# Patient Record
Sex: MALE | Race: WHITE | NOT HISPANIC OR LATINO | Employment: OTHER | URBAN - NONMETROPOLITAN AREA
[De-identification: names, ages, dates, MRNs, and addresses within clinical notes are randomized per-mention and may not be internally consistent; named-entity substitution may affect disease eponyms.]

---

## 2017-06-13 ENCOUNTER — APPOINTMENT (OUTPATIENT)
Dept: DIABETES SERVICES | Facility: HOSPITAL | Age: 72
End: 2017-06-13

## 2017-06-15 ENCOUNTER — APPOINTMENT (OUTPATIENT)
Dept: DIABETES SERVICES | Facility: HOSPITAL | Age: 72
End: 2017-06-15

## 2021-01-16 ENCOUNTER — IMMUNIZATION (OUTPATIENT)
Dept: VACCINE CLINIC | Facility: HOSPITAL | Age: 76
End: 2021-01-16

## 2021-01-16 PROCEDURE — 91301 HC SARSCO02 VAC 100MCG/0.5ML IM: CPT | Performed by: OBSTETRICS & GYNECOLOGY

## 2021-01-16 PROCEDURE — 0011A: CPT | Performed by: OBSTETRICS & GYNECOLOGY

## 2021-03-03 ENCOUNTER — IMMUNIZATION (OUTPATIENT)
Dept: VACCINE CLINIC | Facility: HOSPITAL | Age: 76
End: 2021-03-03

## 2021-03-03 ENCOUNTER — APPOINTMENT (OUTPATIENT)
Dept: VACCINE CLINIC | Facility: HOSPITAL | Age: 76
End: 2021-03-03

## 2021-03-03 PROCEDURE — 0012A: CPT | Performed by: OBSTETRICS & GYNECOLOGY

## 2021-03-03 PROCEDURE — 91301 HC SARSCO02 VAC 100MCG/0.5ML IM: CPT | Performed by: OBSTETRICS & GYNECOLOGY

## 2023-05-17 ENCOUNTER — TRANSCRIBE ORDERS (OUTPATIENT)
Dept: ADMINISTRATIVE | Facility: HOSPITAL | Age: 78
End: 2023-05-17
Payer: MEDICARE

## 2023-05-17 DIAGNOSIS — R26.89 BALANCE DISORDER: Primary | ICD-10-CM

## 2023-12-29 ENCOUNTER — HOSPITAL ENCOUNTER (OUTPATIENT)
Dept: MRI IMAGING | Facility: HOSPITAL | Age: 78
Discharge: HOME | End: 2023-12-29
Admitting: FAMILY MEDICINE
Payer: MEDICARE

## 2023-12-29 DIAGNOSIS — R26.89 BALANCE DISORDER: ICD-10-CM

## 2023-12-29 LAB — CREAT BLDA-MCNC: 1 MG/DL (ref 0.6–1.3)

## 2023-12-29 PROCEDURE — 82565 ASSAY OF CREATININE: CPT

## 2023-12-29 PROCEDURE — 0 GADOBENATE DIMEGLUMINE 529 MG/ML SOLUTION: Performed by: FAMILY MEDICINE

## 2023-12-29 PROCEDURE — A9577 INJ MULTIHANCE: HCPCS | Performed by: FAMILY MEDICINE

## 2023-12-29 PROCEDURE — 70553 MRI BRAIN STEM W/O & W/DYE: CPT

## 2023-12-29 RX ADMIN — GADOBENATE DIMEGLUMINE 20 ML: 529 INJECTION, SOLUTION INTRAVENOUS at 15:18

## 2024-02-29 ENCOUNTER — TRANSCRIBE ORDERS (OUTPATIENT)
Dept: ADMINISTRATIVE | Facility: HOSPITAL | Age: 79
End: 2024-02-29
Payer: MEDICARE

## 2024-02-29 DIAGNOSIS — I63.9 CEREBRAL INFARCTION, UNSPECIFIED MECHANISM: Primary | ICD-10-CM

## 2024-03-01 ENCOUNTER — TRANSCRIBE ORDERS (OUTPATIENT)
Dept: ADMINISTRATIVE | Facility: HOSPITAL | Age: 79
End: 2024-03-01
Payer: MEDICARE

## 2024-03-01 DIAGNOSIS — I63.9 CEREBRAL INFARCTION, UNSPECIFIED MECHANISM: Primary | ICD-10-CM

## 2024-03-07 ENCOUNTER — TRANSCRIBE ORDERS (OUTPATIENT)
Dept: ADMINISTRATIVE | Facility: HOSPITAL | Age: 79
End: 2024-03-07
Payer: MEDICARE

## 2024-03-07 DIAGNOSIS — R09.89 ABNORMAL CAROTID PULSE: Primary | ICD-10-CM

## 2024-03-11 ENCOUNTER — TRANSCRIBE ORDERS (OUTPATIENT)
Dept: ADMINISTRATIVE | Facility: HOSPITAL | Age: 79
End: 2024-03-11
Payer: MEDICARE

## 2024-03-11 DIAGNOSIS — R09.89 POOR CIRCULATION: Primary | ICD-10-CM

## 2024-03-15 ENCOUNTER — HOSPITAL ENCOUNTER (OUTPATIENT)
Dept: ULTRASOUND IMAGING | Facility: HOSPITAL | Age: 79
Discharge: HOME | End: 2024-03-15
Payer: MEDICARE

## 2024-03-15 DIAGNOSIS — R09.89 ABNORMAL CAROTID PULSE: ICD-10-CM

## 2024-03-15 PROCEDURE — 93880 EXTRACRANIAL BILAT STUDY: CPT

## 2024-05-15 ENCOUNTER — OFFICE VISIT (OUTPATIENT)
Dept: NEUROLOGY | Facility: CLINIC | Age: 79
End: 2024-05-15
Payer: MEDICARE

## 2024-05-15 VITALS
WEIGHT: 220 LBS | DIASTOLIC BLOOD PRESSURE: 80 MMHG | SYSTOLIC BLOOD PRESSURE: 122 MMHG | BODY MASS INDEX: 33.34 KG/M2 | HEART RATE: 70 BPM | OXYGEN SATURATION: 95 % | HEIGHT: 68 IN

## 2024-05-15 DIAGNOSIS — G20.C PARKINSONISM, UNSPECIFIED PARKINSONISM TYPE: Primary | ICD-10-CM

## 2024-05-15 PROBLEM — E11.21 DIABETIC RENAL DISEASE: Status: ACTIVE | Noted: 2024-05-15

## 2024-05-15 PROCEDURE — 1160F RVW MEDS BY RX/DR IN RCRD: CPT | Performed by: NURSE PRACTITIONER

## 2024-05-15 PROCEDURE — 1159F MED LIST DOCD IN RCRD: CPT | Performed by: NURSE PRACTITIONER

## 2024-05-15 PROCEDURE — 99204 OFFICE O/P NEW MOD 45 MIN: CPT | Performed by: NURSE PRACTITIONER

## 2024-05-15 RX ORDER — DESVENLAFAXINE 100 MG/1
1 TABLET, EXTENDED RELEASE ORAL DAILY
COMMUNITY
Start: 2024-04-16

## 2024-05-15 RX ORDER — PANTOPRAZOLE SODIUM 40 MG/1
40 TABLET, DELAYED RELEASE ORAL DAILY
COMMUNITY

## 2024-05-15 RX ORDER — HYDROXYZINE PAMOATE 25 MG/1
CAPSULE ORAL
COMMUNITY
Start: 2024-02-15

## 2024-05-15 RX ORDER — METRONIDAZOLE 10 MG/G
GEL TOPICAL
COMMUNITY
Start: 2024-01-25

## 2024-05-15 RX ORDER — LEVOTHYROXINE SODIUM 0.05 MG/1
TABLET ORAL
COMMUNITY
Start: 2024-03-20

## 2024-05-15 RX ORDER — PRAMIPEXOLE DIHYDROCHLORIDE 0.5 MG/1
0.5 TABLET ORAL AS NEEDED
COMMUNITY
Start: 2024-03-20 | End: 2024-05-15

## 2024-05-15 RX ORDER — HYDROCODONE BITARTRATE AND ACETAMINOPHEN 7.5; 325 MG/1; MG/1
1 TABLET ORAL EVERY 12 HOURS SCHEDULED
COMMUNITY
Start: 2024-05-09

## 2024-05-15 RX ORDER — METOPROLOL SUCCINATE 100 MG/1
TABLET, EXTENDED RELEASE ORAL
COMMUNITY
Start: 2024-01-23

## 2024-05-15 NOTE — PROGRESS NOTES
Neurology Consult Note    Referring Provider:   Lottie Bronson, *     Reason for Consultation:    Tremor    Subjective   History of Present Illness:  Jason Sim is a 78 y.o. male who presents today for tremor.  He is routinely followed by Byron Kebede MD for primary care.     He notes that he has had a tremor for about the past year..  He indicates he has have other family members with history of tremor.  He indicates that is more prominent at rest but can occasionally be present with action.  He notes some other concerning symptoms such as some slowed movements, imbalance, and stiffness.  He denies any significant dysphagia or hoarseness in his voice.  He denies any recent falls.  He indicates that Dr. Wild started him on Mirapex for his tremor.    Allergies:    Patient has no known allergies.    Medications:  Current Outpatient Medications   Medication Sig Dispense Refill   • desvenlafaxine (PRISTIQ) 100 MG 24 hr tablet Take 1 tablet by mouth Daily.     • HYDROcodone-acetaminophen (NORCO) 7.5-325 MG per tablet Take 1 tablet by mouth Every 12 (Twelve) Hours.     • hydrOXYzine pamoate (VISTARIL) 25 MG capsule TAKE 1 CAPSULE BY MOUTH EVERY 8 HOURS AS NEEDED FOR ANXIETY     • levothyroxine (SYNTHROID, LEVOTHROID) 50 MCG tablet TAKE 1 TABLET BY MOUTH IN THE MORNING ON EMPTY STOMACH ONCE DAILY     • metoprolol succinate XL (TOPROL-XL) 100 MG 24 hr tablet TAKE (12) ONE-HALF TABLET BY MOUTH DAILY.     • metroNIDAZOLE (METROGEL) 1 % gel APPLY A PEASIZE AMOUNT TO THE FACE NIGHTLY AS NEEDED FOR ROSACEA AS TOLERATED     • pantoprazole (PROTONIX) 40 MG EC tablet Take 1 tablet by mouth Daily.     • carbidopa-levodopa (Sinemet)  MG per tablet Take 1 tablet by mouth 3 (Three) Times a Day. 60 tablet 2     No current facility-administered medications for this visit.     Current outpatient and discharge medications have been reconciled for the patient.  Reviewed by: MOE Lang    Past Medical  "History:  Past Medical History:   Diagnosis Date   • Hypertension      History reviewed. No pertinent surgical history.  History reviewed. No pertinent family history.  Social History     Tobacco Use   • Smoking status: Former     Types: Cigarettes     Passive exposure: Never   • Smokeless tobacco: Never   Vaping Use   • Vaping status: Never Used   Substance Use Topics   • Alcohol use: Not Currently   • Drug use: Never     Review of Systems   Musculoskeletal:  Positive for gait problem.   Neurological:  Positive for tremors.         Objective   Vital Signs:         05/15/24  1425   Weight: 99.8 kg (220 lb)     172.7 cm (68\")  Body mass index is 33.45 kg/m².    Physical Exam  Vitals reviewed.   Constitutional:       Appearance: Normal appearance.   HENT:      Head: Normocephalic.      Mouth/Throat:      Pharynx: Oropharynx is clear.   Eyes:      General: Lids are normal.      Extraocular Movements: Extraocular movements intact.      Pupils: Pupils are equal, round, and reactive to light.   Cardiovascular:      Rate and Rhythm: Normal rate and regular rhythm.      Pulses: Normal pulses.   Pulmonary:      Effort: Pulmonary effort is normal.   Musculoskeletal:         General: Normal range of motion.      Cervical back: Normal range of motion and neck supple.   Skin:     General: Skin is warm and dry.      Capillary Refill: Capillary refill takes less than 2 seconds.   Neurological:      Motor: Motor strength is normal.     Coordination: Romberg sign negative.      Deep Tendon Reflexes:      Reflex Scores:       Tricep reflexes are 1+ on the right side and 1+ on the left side.       Bicep reflexes are 1+ on the right side and 1+ on the left side.       Brachioradialis reflexes are 1+ on the right side and 1+ on the left side.       Patellar reflexes are 1+ on the right side and 1+ on the left side.       Achilles reflexes are 1+ on the right side and 1+ on the left side.  Psychiatric:         Mood and Affect: Mood " normal.         Speech: Speech normal.     Neurological Exam  Mental Status  Awake, alert and oriented to person, place and time. Recent and remote memory are intact. Speech is normal. Language is fluent with no aphasia. Attention and concentration are normal.    Cranial Nerves  CN II: Visual acuity is normal. Visual fields full to confrontation.  CN III, IV, VI: Extraocular movements intact bilaterally. Normal lids and orbits bilaterally. Pupils equal round and reactive to light bilaterally.  CN V: Facial sensation is normal.  CN VII: Full and symmetric facial movement.  CN IX, X: Palate elevates symmetrically. Normal gag reflex.  CN XI: Shoulder shrug strength is normal.  CN XII: Tongue midline without atrophy or fasciculations.    Motor   Strength is 5/5 throughout all four extremities.    Sensory  Sensation is intact to light touch, pinprick, vibration and proprioception in all four extremities.    Reflexes                                            Right                      Left  Brachioradialis                    1+                         1+  Biceps                                 1+                         1+  Triceps                                1+                         1+  Patellar                                1+                         1+  Achilles                                1+                         1+    Coordination    Rest tremor noted in left upper extremity without any significant motor component.  There is evidence of bradykinesia's on rapid alternating movements.    Gait  Casual gait: Normal stance. Reduced stride length. Reduced right arm swing. Reduced left arm swing. Romberg is absent. Unable to rise from chair without using arms.    Results Review:    Lab Results   Component Value Date    GLUCOSE 141 (H) 08/19/2014    BUN 12 08/19/2014    CREATININE 1.00 12/29/2023    K 3.9 08/19/2014    CO2 25 08/19/2014    CALCIUM 8.9 08/19/2014    ALBUMIN 4.6 08/19/2014    AST 25 08/19/2014     "ALT 26 08/19/2014     Lab Results   Component Value Date    WBC 7.12 08/19/2014    HGB 14.6 08/19/2014    HCT 43.9 08/19/2014    MCV 83.8 08/19/2014     08/19/2014     No results found for: \"CHOL\", \"CHLPL\", \"TRIG\", \"HDL\", \"LDL\", \"LDLDIRECT\"  No results found for: \"TSH\"  No results found for: \"HGBA1C\"  No results found for: \"FOLATE\"  No results found for: \"ISAHAJTA42\"    Chart Review:  PROGRESS NOTES - SCAN - OV NOTE_[facility]_03/20/24 (03/20/2024)      Plan   Diagnoses and all orders for this visit:    1. Parkinsonism, unspecified Parkinsonism type (Primary)  Assessment & Plan:  At this point I do not believe that his tremor is most indicative of a essential tremor.  However, I do believe he has some features concerning for Parkinson's.  He indicates he has a family history with a couple of family members who have Parkinson's disease.  On examination I do find that he has a rest tremor of the left upper extremity without any significant motor component.  Additionally he has some lack of arm swing and very short strides.  His gait is not definitively abnormal with shuffling or festination.  He does have bradykinesia on examination.  At this point I would like to do a trial of Sinemet to see how this helps.  Will start Sinemet  mg 3 times daily.  We will go ahead and discontinue his Mirapex as we are starting him on Sinemet today.  Side effect discussed with the patient.  He is understanding and agreeable.    Orders:  -     carbidopa-levodopa (Sinemet)  MG per tablet; Take 1 tablet by mouth 3 (Three) Times a Day.  Dispense: 60 tablet; Refill: 2      Follow-Up:  Return in about 2 months (around 7/15/2024) for Tremor.         MOE Lang  05/20/24  09:04 CDT  "

## 2024-05-20 PROBLEM — G20.C PARKINSONISM: Status: ACTIVE | Noted: 2024-05-20

## 2024-05-20 NOTE — ASSESSMENT & PLAN NOTE
At this point I do not believe that his tremor is most indicative of a essential tremor.  However, I do believe he has some features concerning for Parkinson's.  He indicates he has a family history with a couple of family members who have Parkinson's disease.  On examination I do find that he has a rest tremor of the left upper extremity without any significant motor component.  Additionally he has some lack of arm swing and very short strides.  His gait is not definitively abnormal with shuffling or festination.  He does have bradykinesia on examination.  At this point I would like to do a trial of Sinemet to see how this helps.  Will start Sinemet  mg 3 times daily.  We will go ahead and discontinue his Mirapex as we are starting him on Sinemet today.  Side effect discussed with the patient.  He is understanding and agreeable.

## 2024-08-05 DIAGNOSIS — G20.C PARKINSONISM, UNSPECIFIED PARKINSONISM TYPE: ICD-10-CM

## 2024-11-07 ENCOUNTER — TELEPHONE (OUTPATIENT)
Dept: NEUROLOGY | Facility: CLINIC | Age: 79
End: 2024-11-07
Payer: MEDICARE

## 2024-11-07 NOTE — TELEPHONE ENCOUNTER
LEFT A VOICEMAIL REGARDING SCHEDULING A FOLLOW-UP APPT WITH NEUROLOGY. I EXPLAINED I COULD NOT LEAVE A DETAILED MESSAGE, AS THE VOICEMAIL GREETING DOES NOT IDENTIFY THE PATIENT OR PATIENT CONTACTS. I AM MAILING THE APPT REMINDER & LEFT THE OFFICE PHONE FOR HIM TO CALL FOR APPT DETAILS.    SCHEDULED FOR 12/16/24 @ 9:00, WITH DR. JERROD WEN FOR HUB TO RELAY